# Patient Record
Sex: FEMALE | Race: WHITE | NOT HISPANIC OR LATINO | Employment: OTHER | URBAN - METROPOLITAN AREA
[De-identification: names, ages, dates, MRNs, and addresses within clinical notes are randomized per-mention and may not be internally consistent; named-entity substitution may affect disease eponyms.]

---

## 2019-02-15 ENCOUNTER — APPOINTMENT (OUTPATIENT)
Dept: GENERAL RADIOLOGY | Facility: CLINIC | Age: 74
End: 2019-02-15
Attending: NURSE PRACTITIONER
Payer: MEDICARE

## 2019-02-15 ENCOUNTER — HOSPITAL ENCOUNTER (EMERGENCY)
Facility: CLINIC | Age: 74
Discharge: HOME | End: 2019-02-15
Attending: NURSE PRACTITIONER | Admitting: NURSE PRACTITIONER
Payer: MEDICARE

## 2019-02-15 VITALS
RESPIRATION RATE: 20 BRPM | TEMPERATURE: 97.4 F | SYSTOLIC BLOOD PRESSURE: 132 MMHG | DIASTOLIC BLOOD PRESSURE: 81 MMHG | HEART RATE: 66 BPM | OXYGEN SATURATION: 99 %

## 2019-02-15 DIAGNOSIS — M79.644 FINGER PAIN, RIGHT: ICD-10-CM

## 2019-02-15 PROBLEM — G63 POLYNEUROPATHY ASSOCIATED WITH UNDERLYING DISEASE (H): Status: ACTIVE | Noted: 2018-08-27

## 2019-02-15 PROBLEM — H40.1131 PRIMARY OPEN ANGLE GLAUCOMA OF BOTH EYES, MILD STAGE: Status: ACTIVE | Noted: 2017-11-29

## 2019-02-15 PROBLEM — H04.123 DRY EYE SYNDROME, BILATERAL: Status: ACTIVE | Noted: 2018-03-07

## 2019-02-15 PROBLEM — M79.7 FIBROMYALGIA: Status: ACTIVE | Noted: 2018-10-10

## 2019-02-15 PROBLEM — I20.89 ANGINA AT REST (H): Status: ACTIVE | Noted: 2018-08-27

## 2019-02-15 LAB
ALBUMIN SERPL-MCNC: 3.4 G/DL (ref 3.4–5)
ALP SERPL-CCNC: 48 U/L (ref 40–150)
ALT SERPL W P-5'-P-CCNC: 31 U/L (ref 0–50)
ANION GAP SERPL CALCULATED.3IONS-SCNC: 5 MMOL/L (ref 3–14)
AST SERPL W P-5'-P-CCNC: 25 U/L (ref 0–45)
BASOPHILS # BLD AUTO: 0 10E9/L (ref 0–0.2)
BASOPHILS NFR BLD AUTO: 0.6 %
BILIRUB SERPL-MCNC: 0.4 MG/DL (ref 0.2–1.3)
BUN SERPL-MCNC: 20 MG/DL (ref 7–30)
CALCIUM SERPL-MCNC: 9.3 MG/DL (ref 8.5–10.1)
CHLORIDE SERPL-SCNC: 108 MMOL/L (ref 94–109)
CO2 SERPL-SCNC: 27 MMOL/L (ref 20–32)
CREAT SERPL-MCNC: 0.98 MG/DL (ref 0.52–1.04)
CRP SERPL-MCNC: <2.9 MG/L (ref 0–8)
DIFFERENTIAL METHOD BLD: ABNORMAL
EOSINOPHIL # BLD AUTO: 0.1 10E9/L (ref 0–0.7)
EOSINOPHIL NFR BLD AUTO: 0.9 %
ERYTHROCYTE [DISTWIDTH] IN BLOOD BY AUTOMATED COUNT: 12.3 % (ref 10–15)
GFR SERPL CREATININE-BSD FRML MDRD: 56 ML/MIN/{1.73_M2}
GLUCOSE SERPL-MCNC: 99 MG/DL (ref 70–99)
HCT VFR BLD AUTO: 39.1 % (ref 35–47)
HGB BLD-MCNC: 12.8 G/DL (ref 11.7–15.7)
IMM GRANULOCYTES # BLD: 0 10E9/L (ref 0–0.4)
IMM GRANULOCYTES NFR BLD: 0.4 %
LYMPHOCYTES # BLD AUTO: 0.9 10E9/L (ref 0.8–5.3)
LYMPHOCYTES NFR BLD AUTO: 17 %
MCH RBC QN AUTO: 31.9 PG (ref 26.5–33)
MCHC RBC AUTO-ENTMCNC: 32.7 G/DL (ref 31.5–36.5)
MCV RBC AUTO: 98 FL (ref 78–100)
MONOCYTES # BLD AUTO: 0.5 10E9/L (ref 0–1.3)
MONOCYTES NFR BLD AUTO: 9 %
NEUTROPHILS # BLD AUTO: 3.9 10E9/L (ref 1.6–8.3)
NEUTROPHILS NFR BLD AUTO: 72.1 %
NRBC # BLD AUTO: 0 10*3/UL
NRBC BLD AUTO-RTO: 0 /100
PLATELET # BLD AUTO: 141 10E9/L (ref 150–450)
POTASSIUM SERPL-SCNC: 4.2 MMOL/L (ref 3.4–5.3)
PROT SERPL-MCNC: 7 G/DL (ref 6.8–8.8)
RBC # BLD AUTO: 4.01 10E12/L (ref 3.8–5.2)
SODIUM SERPL-SCNC: 140 MMOL/L (ref 133–144)
WBC # BLD AUTO: 5.4 10E9/L (ref 4–11)

## 2019-02-15 PROCEDURE — 73140 X-RAY EXAM OF FINGER(S): CPT | Mod: RT

## 2019-02-15 PROCEDURE — 85025 COMPLETE CBC W/AUTO DIFF WBC: CPT | Performed by: NURSE PRACTITIONER

## 2019-02-15 PROCEDURE — 80053 COMPREHEN METABOLIC PANEL: CPT | Performed by: NURSE PRACTITIONER

## 2019-02-15 PROCEDURE — G0463 HOSPITAL OUTPT CLINIC VISIT: HCPCS

## 2019-02-15 PROCEDURE — 99214 OFFICE O/P EST MOD 30 MIN: CPT | Mod: Z6 | Performed by: NURSE PRACTITIONER

## 2019-02-15 PROCEDURE — 86140 C-REACTIVE PROTEIN: CPT | Performed by: NURSE PRACTITIONER

## 2019-02-15 RX ORDER — METOPROLOL SUCCINATE 25 MG/1
25 TABLET, EXTENDED RELEASE ORAL
COMMUNITY
Start: 2018-06-18

## 2019-02-15 RX ORDER — ESCITALOPRAM OXALATE 20 MG/1
20 TABLET ORAL
COMMUNITY
Start: 2018-11-19

## 2019-02-15 RX ORDER — LEVOTHYROXINE SODIUM 75 UG/1
75 TABLET ORAL
COMMUNITY
Start: 2019-01-09

## 2019-02-15 RX ORDER — BACLOFEN 10 MG/1
15 TABLET ORAL
COMMUNITY
Start: 2018-07-09

## 2019-02-15 RX ORDER — ASPIRIN 81 MG/1
TABLET ORAL
COMMUNITY
Start: 2009-03-13

## 2019-02-15 RX ORDER — LOSARTAN POTASSIUM 25 MG/1
25 TABLET ORAL
COMMUNITY
Start: 2019-01-09

## 2019-02-15 RX ORDER — ALBUTEROL SULFATE 90 UG/1
2 AEROSOL, METERED RESPIRATORY (INHALATION)
COMMUNITY
Start: 2018-10-16

## 2019-02-15 RX ORDER — ATORVASTATIN CALCIUM 40 MG/1
40 TABLET, FILM COATED ORAL
COMMUNITY
Start: 2018-11-13

## 2019-02-15 RX ORDER — WARFARIN SODIUM 2.5 MG/1
TABLET ORAL
COMMUNITY

## 2019-02-15 RX ORDER — LORAZEPAM 1 MG/1
TABLET ORAL
COMMUNITY
Start: 2018-11-19

## 2019-02-15 RX ORDER — PREDNISONE 20 MG/1
TABLET ORAL
Qty: 9 TABLET | Refills: 0 | Status: SHIPPED | OUTPATIENT
Start: 2019-02-15 | End: 2019-02-25

## 2019-02-15 ASSESSMENT — ENCOUNTER SYMPTOMS
JOINT SWELLING: 1
DIARRHEA: 0
NAUSEA: 0
HEADACHES: 0
ABDOMINAL PAIN: 0
COUGH: 0
DIAPHORESIS: 0
EYE REDNESS: 0
CONFUSION: 0
VOMITING: 0
CHILLS: 0
DIFFICULTY URINATING: 0
FEVER: 0
SHORTNESS OF BREATH: 0
DYSURIA: 0
CONSTIPATION: 0
WHEEZING: 0

## 2019-02-15 NOTE — ED PROVIDER NOTES
History     Chief Complaint   Patient presents with     Hand Pain     [id number] finger pain with swelling.  woke up like that this morning      HPI  Cassandra Sarmiento is a 73 year old female who admits to past medical history of heart disease, stroke, hypertension, hypothyroidism, chronic anticoagulation therapy, and breast cancer presenting to the urgent care with sudden onset of severe, aching, throbbing, stabbing pain in her right second finger.  Patient reports that the pain starts on the PIP joint and extends up to the MIP joint.  Patient reports swelling as well.  Patient reports she woke up with the pain this morning.  Patient denies taking any medications for this.  Patient denies history of gout and reactive arthritis.  Patient denies fever, aches, chills, sweats, confusion, disorientation.  Patient reports feeling well otherwise; see review of systems.    Allergies:  Allergies   Allergen Reactions     Clarithromycin Shortness Of Breath     Clopidogrel Shortness Of Breath and Hives     Lisinopril Shortness Of Breath and Hives     Sulfa Drugs Shortness Of Breath     Clindamycin Hcl      Codeine Sulfate      Erythromycin      Gabapentin      Hydroxyzine      Irbesartan      Latex      Topiramate        Problem List:    Patient Active Problem List    Diagnosis Date Noted     Fibromyalgia 10/10/2018     Priority: Medium     Angina at rest (H) 08/27/2018     Priority: Medium     Polyneuropathy associated with underlying disease (H) 08/27/2018     Priority: Medium     Dry eye syndrome, bilateral 03/07/2018     Priority: Medium     Primary open angle glaucoma of both eyes, mild stage 11/29/2017     Priority: Medium     CKD (chronic kidney disease) stage 3, GFR 30-59 ml/min (H) 10/22/2016     Priority: Medium     Type 2 diabetes with nephropathy (H) 10/03/2016     Priority: Medium     Anticoagulated on Coumadin 01/07/2014     Priority: Medium     Overview:   For secondary stroke prophylaxis with recurrent stroke  while on Effient (allergy to plavix) and aspirin       Coronary atherosclerosis 01/13/2008     Priority: Medium     Overview:   1/13/08 - Non Q-wave MI - PTCA & stenting (bare metal 3.0x25mm ) mid RCA  4/16/2014: Successful angioplasty and stenting (drug eluting) of the ostial right coronary artery       Depression, recurrent (H) 04/23/2007     Priority: Medium     Generalized anxiety disorder 04/23/2007     Priority: Medium     Overview:   DR HER       Esophageal reflux 01/13/2006     Priority: Medium     Essential hypertension 01/13/2006     Priority: Medium     Hyperlipidemia with target low density lipoprotein (LDL) cholesterol less than 100 mg/dL 01/13/2006     Priority: Medium     Overview:   Due to hx of CVA and CAD          Past Medical History:    Past Medical History:   Diagnosis Date     Cancer (H)      CKD (chronic kidney disease) stage 3, GFR 30-59 ml/min (H) 10/22/2016     Heart disease      Hypertension      Stroke (H)        Past Surgical History:    Past Surgical History:   Procedure Laterality Date     C CARDIAC SURG PROCEDURE UNLIST       C HAND/FINGER SURGERY UNLISTED       C STOMACH SURGERY PROCEDURE UNLISTED       HC VASCULAR SURGERY PROCEDURE UNLIST       KNEE SURGERY         Family History:    History reviewed. No pertinent family history.    Social History:  Marital Status:  Single [1]  Social History     Tobacco Use     Smoking status: Never Smoker     Smokeless tobacco: Never Used   Substance Use Topics     Alcohol use: Not on file     Drug use: Not on file        Medications:      albuterol (VENTOLIN HFA) 108 (90 Base) MCG/ACT inhaler   Ascorbic Acid 500 MG CAPS   aspirin 81 MG EC tablet   atorvastatin (LIPITOR) 40 MG tablet   baclofen (LIORESAL) 10 MG tablet   escitalopram (LEXAPRO) 20 MG tablet   levothyroxine (SYNTHROID/LEVOTHROID) 75 MCG tablet   LORazepam (ATIVAN) 1 MG tablet   losartan (COZAAR) 25 MG tablet   metoprolol succinate ER (TOPROL-XL) 25 MG 24 hr tablet    predniSONE (DELTASONE) 20 MG tablet   betamethasone valerate (VALISONE) 0.1 % lotion   calcium 500-125 MG-UNIT TABS   furosemide (LASIX) 20 MG tablet   nitroGLYCERIN (NITROSTAT) 0.4 MG SL tablet   warfarin (COUMADIN) 2.5 MG tablet     Review of Systems   Constitutional: Negative for chills, diaphoresis and fever.   HENT: Negative for congestion and ear pain.    Eyes: Negative for redness and visual disturbance.   Respiratory: Negative for cough, shortness of breath and wheezing.    Cardiovascular: Negative for chest pain.   Gastrointestinal: Negative for abdominal pain, constipation, diarrhea, nausea and vomiting.   Genitourinary: Negative for difficulty urinating and dysuria.   Musculoskeletal: Positive for joint swelling (right second finger   pain and swelling).   Neurological: Negative for headaches.   Psychiatric/Behavioral: Negative for confusion.   All other systems reviewed and are negative.      Physical Exam   BP: 132/81  Pulse: 66  Temp: 97.4  F (36.3  C)  Resp: 20  SpO2: 99 %      Physical Exam   Constitutional: She is oriented to person, place, and time. She appears well-developed and well-nourished.  Non-toxic appearance. She does not have a sickly appearance. She does not appear ill. She appears distressed (appear to be in pain).   HENT:   Head: Normocephalic and atraumatic.   Right Ear: External ear normal.   Left Ear: External ear normal.   Eyes: Conjunctivae are normal. Right eye exhibits no discharge. Left eye exhibits no discharge. No scleral icterus.   Neck: Normal range of motion. Neck supple.   Cardiovascular: Regular rhythm and normal heart sounds. Exam reveals no friction rub.   No murmur heard.  Pulmonary/Chest: Effort normal and breath sounds normal. No stridor. No respiratory distress. She has no wheezes. She has no rales. She exhibits no tenderness.   Musculoskeletal:        Right hand: She exhibits decreased range of motion and swelling. She exhibits no tenderness and no bony  tenderness.   Moderate swelling noted of the entire right second finger with tenderness noted from the MIP joint to the PIP joint with some faint erythema noted as well without fluctuance or lymphangitis.     Neurological: She is alert and oriented to person, place, and time. Coordination normal.   Skin: Skin is warm and dry. No rash noted. She is not diaphoretic. No erythema. No pallor.   Psychiatric: She has a normal mood and affect.   Nursing note and vitals reviewed.      ED Course        Procedures    Results for orders placed or performed during the hospital encounter of 02/15/19 (from the past 24 hour(s))   XR Finger Right G/E 2 Views    Narrative    XR FINGER RT G/E 2 VW 2/15/2019 1:48 PM    HISTORY: Pain.    COMPARISON: None.      Impression    IMPRESSION: No evidence of acute fracture or malalignment.  Degenerative changes of the proximal phalangeal joint with joint space  loss and osteophyte formation.     MALIK RIOS MD   CBC with platelets differential   Result Value Ref Range    WBC 5.4 4.0 - 11.0 10e9/L    RBC Count 4.01 3.8 - 5.2 10e12/L    Hemoglobin 12.8 11.7 - 15.7 g/dL    Hematocrit 39.1 35.0 - 47.0 %    MCV 98 78 - 100 fl    MCH 31.9 26.5 - 33.0 pg    MCHC 32.7 31.5 - 36.5 g/dL    RDW 12.3 10.0 - 15.0 %    Platelet Count 141 (L) 150 - 450 10e9/L    Diff Method Automated Method     % Neutrophils 72.1 %    % Lymphocytes 17.0 %    % Monocytes 9.0 %    % Eosinophils 0.9 %    % Basophils 0.6 %    % Immature Granulocytes 0.4 %    Nucleated RBCs 0 0 /100    Absolute Neutrophil 3.9 1.6 - 8.3 10e9/L    Absolute Lymphocytes 0.9 0.8 - 5.3 10e9/L    Absolute Monocytes 0.5 0.0 - 1.3 10e9/L    Absolute Eosinophils 0.1 0.0 - 0.7 10e9/L    Absolute Basophils 0.0 0.0 - 0.2 10e9/L    Abs Immature Granulocytes 0.0 0 - 0.4 10e9/L    Absolute Nucleated RBC 0.0    Comprehensive metabolic panel   Result Value Ref Range    Sodium 140 133 - 144 mmol/L    Potassium 4.2 3.4 - 5.3 mmol/L    Chloride 108 94 - 109 mmol/L     Carbon Dioxide 27 20 - 32 mmol/L    Anion Gap 5 3 - 14 mmol/L    Glucose 99 70 - 99 mg/dL    Urea Nitrogen 20 7 - 30 mg/dL    Creatinine 0.98 0.52 - 1.04 mg/dL    GFR Estimate 56 (L) >60 mL/min/[1.73_m2]    GFR Estimate If Black 65 >60 mL/min/[1.73_m2]    Calcium 9.3 8.5 - 10.1 mg/dL    Bilirubin Total 0.4 0.2 - 1.3 mg/dL    Albumin 3.4 3.4 - 5.0 g/dL    Protein Total 7.0 6.8 - 8.8 g/dL    Alkaline Phosphatase 48 40 - 150 U/L    ALT 31 0 - 50 U/L    AST 25 0 - 45 U/L   CRP inflammation   Result Value Ref Range    CRP Inflammation <2.9 0.0 - 8.0 mg/L       Medications - No data to display    Assessments & Plan (with Medical Decision Making)     I have reviewed the nursing notes.    I have reviewed the findings, diagnosis, plan and need for follow up with the patient.  Cassandra Sarmiento is a 73 year old female who admits to past medical history of heart disease, stroke, hypertension, hypothyroidism, chronic anticoagulation therapy, and breast cancer presenting to the urgent care with sudden onset of severe, aching, throbbing, stabbing pain in her right second finger.  Patient reports that the pain starts on the PIP joint and extends up to the MIP joint.  Patient reports swelling as well.  Patient reports she woke up with the pain this morning.  Patient denies taking any medications for this.  Patient denies history of gout and reactive arthritis.  Patient denies fever, aches, chills, sweats, confusion, disorientation.  Exam as noted above.  CBC completed and is unremarkable conference of metabolic obtained and is unremarkable.  CRP obtained and is unremarkable.  No evidence of acute systemic infection.  X-ray reveals no acute fracture and no concern for acute osteomyelitis.  Did not obtain a uric acid but did review with the patient that may be gout differential likely gout or reactive arthritis.  Will treat with prednisone on a 10-day taper.  And discussed taking 2 extra strength Tylenol every 6 hours.  Recommend  follow-up with primary care in a week.  Also discussed reasons to return urgently to include fever, aches, chills, mental confusion that would indicate a bacterial infection.  Patient verbalizes understanding and denies any questions at this point in time.  Patient discharged in stable condition.     Medication List      Started    predniSONE 20 MG tablet  Commonly known as:  DELTASONE  2 tabs day 1-2, then 1 tab days 3-4, then 1/2 tab daily for 6 days            Final diagnoses:   Finger pain, right - likely reactive arthritis, cannot exclude gout,       2/15/2019   [facility] EMERGENCY DEPARTMENT     Bettye Mancini, PATRICE CNP  02/15/19 [id number]

## 2019-02-15 NOTE — ED AVS SNAPSHOT
[facility] Emergency Department  [address] [place] [address]  Phone:  [phone]  Fax:  [phone]                                    Cassandra Sarmiento   MRN: [id number]    Department:  [facility] Emergency Department   Date of Visit:  2/15/2019           After Visit Summary Signature Page    I have received my discharge instructions, and my questions have been answered. I have discussed any challenges I see with this plan with the nurse or doctor.    ..........................................................................................................................................  Patient/Patient Representative Signature      ..........................................................................................................................................  Patient Representative Print Name and Relationship to Patient    ..................................................               ................................................  Date                                   Time    ..........................................................................................................................................  Reviewed by Signature/Title    ...................................................              ..............................................  Date                                               Time          22EPIC Rev 08/18

## 2023-01-01 ENCOUNTER — DOCUMENTATION ONLY (OUTPATIENT)
Dept: CARDIOLOGY | Facility: CLINIC | Age: 78
End: 2023-01-01
Payer: MEDICARE

## 2023-01-01 ENCOUNTER — ANCILLARY PROCEDURE (OUTPATIENT)
Dept: NUCLEAR MEDICINE | Facility: CLINIC | Age: 78
End: 2023-01-01
Attending: PSYCHIATRY & NEUROLOGY
Payer: MEDICARE

## 2023-01-01 ENCOUNTER — TELEPHONE (OUTPATIENT)
Dept: CARDIOLOGY | Facility: CLINIC | Age: 78
End: 2023-01-01
Payer: MEDICARE

## 2023-01-01 DIAGNOSIS — G20.C PARKINSONISM (H): ICD-10-CM

## 2023-01-01 PROCEDURE — 78803 RP LOCLZJ TUM SPECT 1 AREA: CPT | Mod: GC | Performed by: RADIOLOGY

## 2023-01-01 PROCEDURE — A9584 IODINE I-123 IOFLUPANE: HCPCS | Performed by: RADIOLOGY

## 2023-01-01 RX ORDER — IOFLUPANE I-123 2 MCI/ML
4-6 INJECTION, SOLUTION INTRAVENOUS ONCE
Status: COMPLETED | OUTPATIENT
Start: 2023-01-01 | End: 2023-01-01

## 2023-01-01 RX ADMIN — IOFLUPANE I-123 4.9 MILLICURIE: 2 INJECTION, SOLUTION INTRAVENOUS at 11:50

## 2023-08-18 ENCOUNTER — ANCILLARY ORDERS (OUTPATIENT)
Dept: NUCLEAR MEDICINE | Facility: CLINIC | Age: 78
End: 2023-08-18

## 2023-08-18 DIAGNOSIS — G20.C PARKINSONISM (H): Primary | ICD-10-CM

## 2023-10-18 NOTE — TELEPHONE ENCOUNTER
Detailed message left in regards to holding restricted medication for scan tomorrow. Phone number left and asked pt to call back to confirm.